# Patient Record
Sex: MALE | Race: WHITE | NOT HISPANIC OR LATINO | Employment: FULL TIME | URBAN - NONMETROPOLITAN AREA
[De-identification: names, ages, dates, MRNs, and addresses within clinical notes are randomized per-mention and may not be internally consistent; named-entity substitution may affect disease eponyms.]

---

## 2023-06-15 ENCOUNTER — OFFICE VISIT (OUTPATIENT)
Dept: FAMILY MEDICINE | Facility: OTHER | Age: 53
End: 2023-06-15
Attending: NURSE PRACTITIONER
Payer: OTHER MISCELLANEOUS

## 2023-06-15 ENCOUNTER — TELEPHONE (OUTPATIENT)
Dept: FAMILY MEDICINE | Facility: OTHER | Age: 53
End: 2023-06-15

## 2023-06-15 VITALS
WEIGHT: 280 LBS | BODY MASS INDEX: 40.09 KG/M2 | HEIGHT: 70 IN | TEMPERATURE: 98.1 F | DIASTOLIC BLOOD PRESSURE: 86 MMHG | RESPIRATION RATE: 16 BRPM | HEART RATE: 92 BPM | OXYGEN SATURATION: 96 % | SYSTOLIC BLOOD PRESSURE: 134 MMHG
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
## 2023-06-15 DIAGNOSIS — S20.96XA TICK BITE OF THORACIC WALL, UNSPECIFIED WHETHER FRONT OR BACK, INITIAL ENCOUNTER: Primary | ICD-10-CM

## 2023-06-15 PROCEDURE — 99203 OFFICE O/P NEW LOW 30 MIN: CPT

## 2023-06-15 RX ORDER — ASPIRIN 325 MG
325 TABLET ORAL
COMMUNITY

## 2023-06-15 RX ORDER — LISINOPRIL 40 MG/1
1 TABLET ORAL DAILY
COMMUNITY
Start: 2022-10-25

## 2023-06-15 RX ORDER — ROSUVASTATIN CALCIUM 10 MG/1
1 TABLET, COATED ORAL DAILY
COMMUNITY
Start: 2023-01-23

## 2023-06-15 RX ORDER — METFORMIN HCL 500 MG
TABLET, EXTENDED RELEASE 24 HR ORAL
COMMUNITY
Start: 2023-05-26

## 2023-06-15 RX ORDER — CHLORTHALIDONE 25 MG/1
1 TABLET ORAL DAILY
COMMUNITY
Start: 2022-11-15

## 2023-06-15 RX ORDER — AMLODIPINE BESYLATE 10 MG/1
1 TABLET ORAL DAILY
COMMUNITY
Start: 2022-10-25

## 2023-06-15 RX ORDER — METOPROLOL SUCCINATE 50 MG/1
50 TABLET, EXTENDED RELEASE ORAL
COMMUNITY
Start: 2022-10-31

## 2023-06-15 RX ORDER — METOPROLOL SUCCINATE 100 MG/1
100 TABLET, EXTENDED RELEASE ORAL
COMMUNITY
Start: 2022-10-31

## 2023-06-15 RX ORDER — DOXYCYCLINE 100 MG/1
100 CAPSULE ORAL 2 TIMES DAILY
Qty: 20 CAPSULE | Refills: 0 | Status: SHIPPED | OUTPATIENT
Start: 2023-06-15 | End: 2023-06-25

## 2023-06-15 ASSESSMENT — ENCOUNTER SYMPTOMS
NUMBNESS: 0
MYALGIAS: 0
JOINT SWELLING: 0
DIZZINESS: 0
ARTHRALGIAS: 0
LIGHT-HEADEDNESS: 0
FATIGUE: 0
PARESTHESIAS: 0
FEVER: 0
CHILLS: 0
HEADACHES: 0
WOUND: 1

## 2023-06-15 ASSESSMENT — PAIN SCALES - GENERAL: PAINLEVEL: MILD PAIN (2)

## 2023-06-15 NOTE — PATIENT INSTRUCTIONS
Start doxycycline 100 mg tablet -- take twice daily (AM and PM) after meals.   Avoid dairy products within 1 to 2 hours (before or after) taking antibiotic -- as it will grab the antibiotic and not let it work.   -- You can eat whatever you want for lunch.   -- Take calcium or magnesium or multivitamin supplements at lunchtime while taking doxycycline.  -Avoid sunlight- wear sunscreen and hat while on this medication- it increases your likelihood of severe sunburn

## 2023-06-15 NOTE — TELEPHONE ENCOUNTER
Gave verbal order to [facility] pharmacy. Made patient aware that order is now there. Patient verbalizes understanding and had no further questions.     Coral Heath LPN on 6/15/2023 at 4:16 PM

## 2023-06-15 NOTE — NURSING NOTE
"Chief Complaint   Patient presents with     Work Comp     Tick bite upper left torso   He found a tick on his upper left torso yesterday. It is red and painful.  Yasmin Zhang LPN..................6/15/2023   10:05 AM    Initial /86   Pulse 92   Temp 98.1  F (36.7  C) (Temporal)   Resp 16   Ht 1.778 m (5' 10\")   Wt 127 kg (280 lb)   SpO2 96%   BMI 40.18 kg/m   Estimated body mass index is 40.18 kg/m  as calculated from the following:    Height as of this encounter: 1.778 m (5' 10\").    Weight as of this encounter: 127 kg (280 lb).  Medication Reconciliation: complete    FOOD SECURITY SCREENING QUESTIONS  Hunger Vital Signs:  Within the past 12 months we worried whether our food would run out before we got money to buy more. Never  Within the past 12 months the food we bought just didn't last and we didn't have money to get more. Never        Advance care directive on file? no  Advance care directive provided to patient? declined     Yasmin Zhang, LIZABETH  "

## 2023-06-15 NOTE — TELEPHONE ENCOUNTER
Pt is still waiting for prescription for Doxycycline to be sent over to [facility] pharmacy.  Please call when done.    Giancarlo Concepcion on 6/15/2023 at 2:46 PM

## 2023-06-15 NOTE — PROGRESS NOTES
Assessment & Plan   Seamus Nathan is a 52 year old presenting for the following health issues:      ICD-10-CM    1. Tick bite of thoracic wall, unspecified whether front or back, initial encounter  S20.96XA doxycycline hyclate (VIBRAMYCIN) 100 MG capsule    W57.XXXA         Started patient on 10-day course of doxycycline for localized skin infection at site of tick bite.  Offered to do lab work today to rule out tickborne illnesses, elected to monitor symptoms and come back in to be reevaluated if he develops any new or worsening symptoms.  Instructed him to avoid dairy products while taking doxycycline, avoid sun exposure due to increased risk of sunburn while on medication.    No follow-ups on file.    AJ Pollard CNP  [facility]      Subjective   Seamus is a 52 year old, presenting for the following health issues:  Work Comp (Tick bite upper left torso)      HPI     Patient presents to clinic for evaluation of a tick bite that he noticed on his left outer chest wall which she removed yesterday.  He states the tick was on for less than a day, it was a deer tick.  He noticed last night and this morning increased redness around the area of the tick bite.  No bull's-eye-like appearance.  Denies any fevers, chills, weakness, muscle aches or any other symptoms.  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            Review of Systems   Constitutional: Negative for chills, fatigue and fever.   Musculoskeletal: Negative for arthralgias, joint swelling and myalgias.   Skin: Positive for wound ( tick bite with localized erythema).   Neurological: Negative for dizziness, light-headedness, numbness, headaches and paresthesias.   All other systems reviewed and are negative.           Objective    /86   Pulse 92   Temp 98.1  F (36.7  C) (Temporal)   Resp 16   Ht 1.778  "m (5' 10\")   Wt 127 kg (280 lb)   SpO2 96%   BMI 40.18 kg/m    Body mass index is 40.18 kg/m .  Physical Exam  Vitals reviewed.   Constitutional:       General: He is not in acute distress.     Appearance: He is not toxic-appearing.   Eyes:      General:         Right eye: No discharge.         Left eye: No discharge.      Conjunctiva/sclera: Conjunctivae normal.      Pupils: Pupils are equal, round, and reactive to light.   Skin:     General: Skin is warm and dry.             Comments: Localized erythema and warmth at tick bite location    Neurological:      General: No focal deficit present.      Mental Status: He is alert and oriented to person, place, and time.      Motor: No weakness.   Psychiatric:         Mood and Affect: Mood normal.         Behavior: Behavior normal.                            "